# Patient Record
Sex: FEMALE | Race: WHITE | Employment: FULL TIME | URBAN - METROPOLITAN AREA
[De-identification: names, ages, dates, MRNs, and addresses within clinical notes are randomized per-mention and may not be internally consistent; named-entity substitution may affect disease eponyms.]

---

## 2018-10-01 PROBLEM — Z86.32 HISTORY OF GESTATIONAL DIABETES: Status: ACTIVE | Noted: 2018-10-01

## 2018-10-01 PROBLEM — R00.2 HEART PALPITATIONS: Status: ACTIVE | Noted: 2018-10-01

## 2018-10-01 PROBLEM — E78.00 HYPERCHOLESTEROLEMIA: Status: ACTIVE | Noted: 2018-10-01

## 2020-12-19 ENCOUNTER — APPOINTMENT (OUTPATIENT)
Dept: CT IMAGING | Age: 35
End: 2020-12-19
Attending: EMERGENCY MEDICINE
Payer: COMMERCIAL

## 2020-12-19 ENCOUNTER — HOSPITAL ENCOUNTER (EMERGENCY)
Age: 35
Discharge: HOME | End: 2020-12-19
Attending: EMERGENCY MEDICINE
Payer: COMMERCIAL

## 2020-12-19 VITALS
HEIGHT: 68 IN | BODY MASS INDEX: 25.46 KG/M2 | OXYGEN SATURATION: 98 % | DIASTOLIC BLOOD PRESSURE: 69 MMHG | TEMPERATURE: 98.8 F | SYSTOLIC BLOOD PRESSURE: 133 MMHG | RESPIRATION RATE: 18 BRPM | HEART RATE: 96 BPM | WEIGHT: 168 LBS

## 2020-12-19 DIAGNOSIS — D18.00 CAVERNOUS HEMANGIOMAS: ICD-10-CM

## 2020-12-19 DIAGNOSIS — K52.9 GASTROENTERITIS, ACUTE: Primary | ICD-10-CM

## 2020-12-19 DIAGNOSIS — Z20.822 PERSON UNDER INVESTIGATION FOR COVID-19: ICD-10-CM

## 2020-12-19 LAB
ALBUMIN SERPL-MCNC: 4.3 G/DL (ref 3.5–5)
ALBUMIN/GLOB SERPL: 1 {RATIO} (ref 1.2–3.5)
ALP SERPL-CCNC: 81 U/L (ref 50–130)
ALT SERPL-CCNC: 23 U/L (ref 12–65)
ANION GAP SERPL CALC-SCNC: 8 MMOL/L (ref 7–16)
AST SERPL-CCNC: 13 U/L (ref 15–37)
BASOPHILS # BLD: 0 K/UL (ref 0–0.2)
BASOPHILS NFR BLD: 0 % (ref 0–2)
BILIRUB SERPL-MCNC: 0.6 MG/DL (ref 0.2–1.1)
BUN SERPL-MCNC: 13 MG/DL (ref 6–23)
CALCIUM SERPL-MCNC: 9.5 MG/DL (ref 8.3–10.4)
CHLORIDE SERPL-SCNC: 106 MMOL/L (ref 98–107)
CO2 SERPL-SCNC: 27 MMOL/L (ref 21–32)
CREAT SERPL-MCNC: 0.79 MG/DL (ref 0.6–1)
DIFFERENTIAL METHOD BLD: ABNORMAL
EOSINOPHIL # BLD: 0 K/UL (ref 0–0.8)
EOSINOPHIL NFR BLD: 0 % (ref 0.5–7.8)
ERYTHROCYTE [DISTWIDTH] IN BLOOD BY AUTOMATED COUNT: 12.6 % (ref 11.9–14.6)
GLOBULIN SER CALC-MCNC: 4.1 G/DL (ref 2.3–3.5)
GLUCOSE SERPL-MCNC: 85 MG/DL (ref 65–100)
HCG UR QL: NEGATIVE
HCT VFR BLD AUTO: 43.8 % (ref 35.8–46.3)
HGB BLD-MCNC: 14.6 G/DL (ref 11.7–15.4)
IMM GRANULOCYTES # BLD AUTO: 0 K/UL (ref 0–0.5)
IMM GRANULOCYTES NFR BLD AUTO: 0 % (ref 0–5)
LIPASE SERPL-CCNC: 140 U/L (ref 73–393)
LYMPHOCYTES # BLD: 1.7 K/UL (ref 0.5–4.6)
LYMPHOCYTES NFR BLD: 21 % (ref 13–44)
MCH RBC QN AUTO: 29.8 PG (ref 26.1–32.9)
MCHC RBC AUTO-ENTMCNC: 33.3 G/DL (ref 31.4–35)
MCV RBC AUTO: 89.4 FL (ref 79.6–97.8)
MONOCYTES # BLD: 0.4 K/UL (ref 0.1–1.3)
MONOCYTES NFR BLD: 5 % (ref 4–12)
NEUTS SEG # BLD: 6.1 K/UL (ref 1.7–8.2)
NEUTS SEG NFR BLD: 74 % (ref 43–78)
NRBC # BLD: 0 K/UL (ref 0–0.2)
PLATELET # BLD AUTO: 224 K/UL (ref 150–450)
PMV BLD AUTO: 10.1 FL (ref 9.4–12.3)
POTASSIUM SERPL-SCNC: 3.5 MMOL/L (ref 3.5–5.1)
PROT SERPL-MCNC: 8.4 G/DL (ref 6.3–8.2)
RBC # BLD AUTO: 4.9 M/UL (ref 4.05–5.2)
SODIUM SERPL-SCNC: 141 MMOL/L (ref 136–145)
WBC # BLD AUTO: 8.2 K/UL (ref 4.3–11.1)

## 2020-12-19 PROCEDURE — 80053 COMPREHEN METABOLIC PANEL: CPT

## 2020-12-19 PROCEDURE — 74011000258 HC RX REV CODE- 258: Performed by: EMERGENCY MEDICINE

## 2020-12-19 PROCEDURE — 83690 ASSAY OF LIPASE: CPT

## 2020-12-19 PROCEDURE — 96374 THER/PROPH/DIAG INJ IV PUSH: CPT

## 2020-12-19 PROCEDURE — 81025 URINE PREGNANCY TEST: CPT

## 2020-12-19 PROCEDURE — 74177 CT ABD & PELVIS W/CONTRAST: CPT

## 2020-12-19 PROCEDURE — 85025 COMPLETE CBC W/AUTO DIFF WBC: CPT

## 2020-12-19 PROCEDURE — 99284 EMERGENCY DEPT VISIT MOD MDM: CPT

## 2020-12-19 PROCEDURE — 81003 URINALYSIS AUTO W/O SCOPE: CPT

## 2020-12-19 PROCEDURE — 74011250636 HC RX REV CODE- 250/636: Performed by: EMERGENCY MEDICINE

## 2020-12-19 PROCEDURE — 74011000636 HC RX REV CODE- 636: Performed by: EMERGENCY MEDICINE

## 2020-12-19 RX ORDER — DICLOFENAC POTASSIUM 50 MG/1
50 TABLET, FILM COATED ORAL 3 TIMES DAILY
Qty: 15 TAB | Refills: 0 | Status: SHIPPED | OUTPATIENT
Start: 2020-12-19 | End: 2021-10-26 | Stop reason: ALTCHOICE

## 2020-12-19 RX ORDER — ONDANSETRON 2 MG/ML
4 INJECTION INTRAMUSCULAR; INTRAVENOUS
Status: COMPLETED | OUTPATIENT
Start: 2020-12-19 | End: 2020-12-19

## 2020-12-19 RX ORDER — ONDANSETRON 4 MG/1
4 TABLET, ORALLY DISINTEGRATING ORAL
Qty: 20 TAB | Refills: 0 | Status: SHIPPED | OUTPATIENT
Start: 2020-12-19

## 2020-12-19 RX ORDER — SODIUM CHLORIDE 0.9 % (FLUSH) 0.9 %
10 SYRINGE (ML) INJECTION
Status: COMPLETED | OUTPATIENT
Start: 2020-12-19 | End: 2020-12-19

## 2020-12-19 RX ADMIN — SODIUM CHLORIDE 1000 ML: 900 INJECTION, SOLUTION INTRAVENOUS at 13:56

## 2020-12-19 RX ADMIN — IOPAMIDOL 100 ML: 755 INJECTION, SOLUTION INTRAVENOUS at 14:36

## 2020-12-19 RX ADMIN — SODIUM CHLORIDE 100 ML: 900 INJECTION, SOLUTION INTRAVENOUS at 14:36

## 2020-12-19 RX ADMIN — ONDANSETRON 4 MG: 2 INJECTION INTRAMUSCULAR; INTRAVENOUS at 13:56

## 2020-12-19 RX ADMIN — Medication 10 ML: at 14:36

## 2020-12-19 NOTE — ED TRIAGE NOTES
Pt states RUQ pain for about one week with nausea and diarrhea. Denies vomiting. States she does have appendix and gallbladder. Pt has a mask in triage.

## 2020-12-19 NOTE — ED NOTES
I have reviewed discharge instructions with the patient. The patient verbalized understanding. Patient left ED via Discharge Method: ambulatory to Home with spouse at bedside. Opportunity for questions and clarification provided. Patient given 2 scripts. To continue your aftercare when you leave the hospital, you may receive an automated call from our care team to check in on how you are doing. This is a free service and part of our promise to provide the best care and service to meet your aftercare needs.  If you have questions, or wish to unsubscribe from this service please call [phone]. Thank you for Choosing our [facility].

## 2020-12-19 NOTE — ACP (ADVANCE CARE PLANNING)
Met with patient to talk about [address] and wishes for CPR and ventilation. Patient/family have refused to have this discussion with . Advised patient/family to let me know if they change their mind.     KARLOS Coreas    [facility]    * [email or web address]

## 2020-12-19 NOTE — DISCHARGE INSTRUCTIONS
Patient Education        Gastroenteritis: Care Instructions  Your Care Instructions     Gastroenteritis is an illness that may cause nausea, vomiting, and diarrhea. It is sometimes called \"stomach flu. \" It can be caused by bacteria or a virus. You will probably begin to feel better in 1 to 2 days. In the meantime, get plenty of rest and make sure you do not become dehydrated. Dehydration occurs when your body loses too much fluid. Follow-up care is a key part of your treatment and safety. Be sure to make and go to all appointments, and call your doctor if you are having problems. It's also a good idea to know your test results and keep a list of the medicines you take. How can you care for yourself at home? · If your doctor prescribed antibiotics, take them as directed. Do not stop taking them just because you feel better. You need to take the full course of antibiotics. · Drink plenty of fluids to prevent dehydration, enough so that your urine is light yellow or clear like water. Choose water and other caffeine-free clear liquids until you feel better. If you have kidney, heart, or liver disease and have to limit fluids, talk with your doctor before you increase your fluid intake. · Drink fluids slowly, in frequent, small amounts, because drinking too much too fast can cause vomiting. · Begin eating mild foods, such as dry toast, yogurt, applesauce, bananas, and rice. Avoid spicy, hot, or high-fat foods, and do not drink alcohol or caffeine for a day or two. Do not drink milk or eat ice cream until you are feeling better. How to prevent gastroenteritis  · Keep hot foods hot and cold foods cold. · Do not eat meats, dressings, salads, or other foods that have been kept at room temperature for more than 2 hours. · Use a thermometer to check your refrigerator. It should be between 34°F and 40°F.  · Defrost meats in the refrigerator or microwave, not on the kitchen counter.   · Keep your hands and your kitchen clean. Wash your hands, cutting boards, and countertops with hot soapy water frequently. · Cook meat until it is well done. · Do not eat raw eggs or uncooked sauces made with raw eggs. · Do not take chances. If food looks or tastes spoiled, throw it out. When should you call for help? Call 911 anytime you think you may need emergency care. For example, call if:    · You vomit blood or what looks like coffee grounds.     · You passed out (lost consciousness).     · You pass maroon or very bloody stools. Call your doctor now or seek immediate medical care if:    · You have severe belly pain.     · You have signs of needing more fluids. You have sunken eyes, a dry mouth, and pass only a little dark urine.     · You feel like you are going to faint.     · You have increased belly pain that does not go away in 1 to 2 days.     · You have new or increased nausea, or you are vomiting.     · You have a new or higher fever.     · Your stools are black and tarlike or have streaks of blood. Watch closely for changes in your health, and be sure to contact your doctor if:    · You are dizzy or lightheaded.     · You urinate less than usual, or your urine is dark yellow or brown.     · You do not feel better with each day that goes by. Where can you learn more? Go to http://www.[employer].com/  Enter N142 in the search box to learn more about \"Gastroenteritis: Care Instructions. \"  Current as of: February 11, 2020               Content Version: 12.6  © 5586-4593 [employer]. Care instructions adapted under license by [employer] (which disclaims liability or warranty for this information). If you have questions about a medical condition or this instruction, always ask your healthcare professional. [address] any warranty or liability for your use of this information.

## 2020-12-19 NOTE — ED PROVIDER NOTES
Patient for the past week has had nausea no vomiting. Diarrhea. No dysuria or hematuria. No vaginal bleeding or discharge. Has had pain over the right abdomen. Points to the right upper quadrant but tender in the right lower quadrant. The history is provided by the patient. No  was used. Nausea   This is a new problem. Episode onset: 1 week ago. The problem occurs 2 to 4 times per day. The problem has not changed since onset. There has been no fever. Associated symptoms include abdominal pain and diarrhea. Pertinent negatives include no chills, no fever, no headaches, no myalgias, no URI and no headaches. Past Medical History:   Diagnosis Date    Carrier of group B Streptococcus     Diabetes mellitus during pregnancy     Multiple thyroid nodules        Past Surgical History:   Procedure Laterality Date    HX  SECTION      HX DILATION AND CURETTAGE      of uterus    HX TONSILLECTOMY           Family History:   Problem Relation Age of Onset    Thyroid Disease Paternal Grandfather         Thyroid Nodules    Thyroid Disease Paternal Aunt         Hypothroidism    Thyroid Disease Other         Hyperthyrodism/Cousin    Elevated Lipids Father     Thyroid Cancer Neg Hx        Social History     Socioeconomic History    Marital status:      Spouse name: Not on file    Number of children: Not on file    Years of education: Not on file    Highest education level: Not on file   Occupational History    Not on file   Social Needs    Financial resource strain: Not on file    Food insecurity     Worry: Not on file     Inability: Not on file    Transportation needs     Medical: Not on file     Non-medical: Not on file   Tobacco Use    Smoking status: Never Smoker    Smokeless tobacco: Never Used   Substance and Sexual Activity    Alcohol use: Yes     Alcohol/week: 0.0 standard drinks     Comment: occ.     Drug use: Not on file    Sexual activity: Not on file   Lifestyle    Physical activity     Days per week: Not on file     Minutes per session: Not on file    Stress: Not on file   Relationships    Social connections     Talks on phone: Not on file     Gets together: Not on file     Attends Islam service: Not on file     Active member of club or organization: Not on file     Attends meetings of clubs or organizations: Not on file     Relationship status: Not on file    Intimate partner violence     Fear of current or ex partner: Not on file     Emotionally abused: Not on file     Physically abused: Not on file     Forced sexual activity: Not on file   Other Topics Concern    Not on file   Social History Narrative    Not on file         ALLERGIES: Codeine    Review of Systems   Constitutional: Negative for chills and fever. HENT: Negative for rhinorrhea and sore throat. Eyes: Negative for pain and redness. Respiratory: Negative for chest tightness, shortness of breath and wheezing. Cardiovascular: Negative for chest pain and leg swelling. Gastrointestinal: Positive for abdominal pain, diarrhea and nausea. Negative for vomiting. Genitourinary: Negative for dysuria, hematuria, vaginal bleeding and vaginal discharge. Musculoskeletal: Negative for back pain, gait problem, myalgias, neck pain and neck stiffness. Skin: Negative for color change and rash. Neurological: Negative for weakness, numbness and headaches. Vitals:    12/19/20 1247   BP: 134/86   Pulse: 95   Resp: 16   Temp: 98.9 °F (37.2 °C)   SpO2: 98%   Weight: 76.2 kg (168 lb)   Height: 5' 8\" (1.727 m)            Physical Exam  Constitutional:       Appearance: Normal appearance. She is well-developed. HENT:      Head: Normocephalic and atraumatic. Neck:      Musculoskeletal: Normal range of motion and neck supple. Cardiovascular:      Rate and Rhythm: Normal rate and regular rhythm.    Pulmonary:      Effort: Pulmonary effort is normal.      Breath sounds: Normal breath sounds. Abdominal:      General: Bowel sounds are normal.      Palpations: Abdomen is soft. Tenderness: There is abdominal tenderness (RLQ). There is guarding. There is no right CVA tenderness. Musculoskeletal: Normal range of motion. General: No swelling. Skin:     General: Skin is warm and dry. Neurological:      General: No focal deficit present. Mental Status: She is alert and oriented to person, place, and time. MDM  Number of Diagnoses or Management Options  Diagnosis management comments: Appendix normal on CT. Discussed this with Dr. Alicia Baker. Patient has multiple cavernous hemangiomas of the liver. Discussed this with GI who will follow up with patient in the office. Amount and/or Complexity of Data Reviewed  Clinical lab tests: ordered and reviewed  Tests in the radiology section of CPT®: ordered and reviewed  Tests in the medicine section of CPT®: ordered and reviewed    Patient Progress  Patient progress: stable         Procedures        CT ABD PELV W CONT (Final result)  Result time 12/19/20 15:13:20  Final result by Marcus Bryant MD (12/19/20 15:13:20)                Impression:    IMPRESSION:  Cavernous hemangiomas right lobe of liver, 8mm - 60 mm. No acute   abnormality. Narrative:    CT ABDOMEN AND PELVIS WITH CONTRAST. HISTORY: Upper quadrant pain for a week. COMPARISON: None. TECHNIQUE: 5 mm axial scans from above the diaphragms to the pubic symphysis   following oral and 100 cc intravenous contrast without acute complication. Intravenous contrast was given to increase the sensitivity to acute   inflammation.  Radiation dose reduction techniques were used for this study. Our CT scanners use one or more of the following: Automated exposure control,   adjustment of the mA and or kV according to patient size, iterative   reconstruction. FINDINGS:   -Lung Bases:  The lung bases are clear.     -Liver: 18 mm peripherally enhancing focus right lobe of liver. Adjacent is a 16   mm peripherally enhancing focus. 8 mm nonenhancing focus x2 right lobe   -Gallbladder/Bile Ducts: No gallstones or bile duct dilation.   -Pancreas: Normal.   -Spleen: Uniform and normal size.     -Stomach: Unremarkable. -Bowel: Normal caliber.  No inflammatory changes.     -Kidneys/Ureters: Enhance symmetrically. No hydronephrosis. -Urinary Bladder: Unremarkable.   -Adrenals: Are normal size. -Reproductive Organs: Unremarkable.     -Lymph Nodes: No grossly enlarged retroperitoneal, mesenteric, or pelvic   adenopathy.   -Vasculature: Aorta is normal caliber.   -Bones: No gross bony lesions.     -Other: No ascites.                   Results Include:    Recent Results (from the past 24 hour(s))   CBC WITH AUTOMATED DIFF    Collection Time: 12/19/20  1:52 PM   Result Value Ref Range    WBC 8.2 4.3 - 11.1 K/uL    RBC 4.90 4.05 - 5.2 M/uL    HGB 14.6 11.7 - 15.4 g/dL    HCT 43.8 35.8 - 46.3 %    MCV 89.4 79.6 - 97.8 FL    MCH 29.8 26.1 - 32.9 PG    MCHC 33.3 31.4 - 35.0 g/dL    RDW 12.6 11.9 - 14.6 %    PLATELET 979 500 - 150 K/uL    MPV 10.1 9.4 - 12.3 FL    ABSOLUTE NRBC 0.00 0.0 - 0.2 K/uL    DF AUTOMATED      NEUTROPHILS 74 43 - 78 %    LYMPHOCYTES 21 13 - 44 %    MONOCYTES 5 4.0 - 12.0 %    EOSINOPHILS 0 (L) 0.5 - 7.8 %    BASOPHILS 0 0.0 - 2.0 %    IMMATURE GRANULOCYTES 0 0.0 - 5.0 %    ABS. NEUTROPHILS 6.1 1.7 - 8.2 K/UL    ABS. LYMPHOCYTES 1.7 0.5 - 4.6 K/UL    ABS. MONOCYTES 0.4 0.1 - 1.3 K/UL    ABS. EOSINOPHILS 0.0 0.0 - 0.8 K/UL    ABS. BASOPHILS 0.0 0.0 - 0.2 K/UL    ABS. IMM.  GRANS. 0.0 0.0 - 0.5 K/UL   METABOLIC PANEL, COMPREHENSIVE    Collection Time: 12/19/20  1:52 PM   Result Value Ref Range    Sodium 141 136 - 145 mmol/L    Potassium 3.5 3.5 - 5.1 mmol/L    Chloride 106 98 - 107 mmol/L    CO2 27 21 - 32 mmol/L    Anion gap 8 7 - 16 mmol/L    Glucose 85 65 - 100 mg/dL    BUN 13 6 - 23 MG/DL    Creatinine 0.79 0.6 - 1.0 MG/DL GFR est AA >60 >60 ml/min/1.73m2    GFR est non-AA >60 >60 ml/min/1.73m2    Calcium 9.5 8.3 - 10.4 MG/DL    Bilirubin, total 0.6 0.2 - 1.1 MG/DL    ALT (SGPT) 23 12 - 65 U/L    AST (SGOT) 13 (L) 15 - 37 U/L    Alk.  phosphatase 81 50 - 130 U/L    Protein, total 8.4 (H) 6.3 - 8.2 g/dL    Albumin 4.3 3.5 - 5.0 g/dL    Globulin 4.1 (H) 2.3 - 3.5 g/dL    A-G Ratio 1.0 (L) 1.2 - 3.5     LIPASE    Collection Time: 12/19/20  1:52 PM   Result Value Ref Range    Lipase 140 73 - 393 U/L   HCG URINE, QL. - POC    Collection Time: 12/19/20  3:05 PM   Result Value Ref Range    Pregnancy test,urine (POC) Negative NEG

## 2020-12-19 NOTE — PROGRESS NOTES
SW met with patient, confirmed demographic information. Patient reports being independent at home, does not use assistive devices to ambulate, and denies any falls. Patient is also established with primary care and is seen regularly. No needs identified from SW at this time.      KARLOS Sandoval     [facility]    * [email or web address]

## 2020-12-24 ENCOUNTER — HOSPITAL ENCOUNTER (OUTPATIENT)
Dept: LAB | Age: 35
Discharge: HOME | End: 2020-12-24

## 2020-12-24 PROCEDURE — 88305 TISSUE EXAM BY PATHOLOGIST: CPT

## 2020-12-24 PROCEDURE — 88312 SPECIAL STAINS GROUP 1: CPT

## 2021-04-18 PROBLEM — D18.03 LIVER HEMANGIOMA: Status: ACTIVE | Noted: 2021-04-18

## 2021-04-18 PROBLEM — D18.00 CAVERNOUS HEMANGIOMAS: Status: ACTIVE | Noted: 2021-04-18

## 2021-04-18 PROBLEM — K29.70 GASTRITIS: Status: ACTIVE | Noted: 2021-04-18

## 2022-03-18 PROBLEM — Z86.32 HISTORY OF GESTATIONAL DIABETES: Status: ACTIVE | Noted: 2018-10-01

## 2022-03-19 PROBLEM — K29.70 GASTRITIS: Status: ACTIVE | Noted: 2021-04-18

## 2022-03-19 PROBLEM — R00.2 HEART PALPITATIONS: Status: ACTIVE | Noted: 2018-10-01

## 2022-03-19 PROBLEM — E78.00 HYPERCHOLESTEROLEMIA: Status: ACTIVE | Noted: 2018-10-01

## 2022-03-20 PROBLEM — D18.00 CAVERNOUS HEMANGIOMAS: Status: ACTIVE | Noted: 2021-04-18

## 2022-10-26 ENCOUNTER — OFFICE VISIT (OUTPATIENT)
Dept: ENDOCRINOLOGY | Age: 37
End: 2022-10-26
Payer: COMMERCIAL

## 2022-10-26 VITALS
OXYGEN SATURATION: 98 % | DIASTOLIC BLOOD PRESSURE: 84 MMHG | SYSTOLIC BLOOD PRESSURE: 116 MMHG | HEART RATE: 81 BPM | WEIGHT: 170 LBS

## 2022-10-26 DIAGNOSIS — Z86.32 HISTORY OF GESTATIONAL DIABETES: ICD-10-CM

## 2022-10-26 DIAGNOSIS — E04.2 MULTIPLE THYROID NODULES: Primary | ICD-10-CM

## 2022-10-26 DIAGNOSIS — Z86.39 HISTORY OF THYROIDITIS: ICD-10-CM

## 2022-10-26 LAB
EST. AVERAGE GLUCOSE BLD GHB EST-MCNC: 105 MG/DL
HBA1C MFR BLD: 5.3 % (ref 4.8–5.6)

## 2022-10-26 PROCEDURE — 99214 OFFICE O/P EST MOD 30 MIN: CPT | Performed by: INTERNAL MEDICINE

## 2022-10-26 PROCEDURE — 76536 US EXAM OF HEAD AND NECK: CPT | Performed by: INTERNAL MEDICINE

## 2022-10-26 ASSESSMENT — ENCOUNTER SYMPTOMS
VOICE CHANGE: 0
TROUBLE SWALLOWING: 0

## 2022-10-26 NOTE — PROGRESS NOTES
Braxton Mejias MD, [address] [place] [address]            Reason for visit: Follow-up of thyroid nodules      ASSESSMENT AND PLAN:    1. Multiple thyroid nodules  Prior biopsy of the dominant nodule in the right lobe in 2013 was fortunately benign. Ultrasound was repeated today. The nodules may have increased slightly in size over time but continue to demonstrate otherwise benign sonographic features. Repeat biopsy is not indicated. I will repeat ultrasound in 1 year. She would also like to discuss possibility of thyroid surgery with Dr. Aiden Elliott at [facility]. I will arrange it.  - US,HEAD/NECK TISSUES,REAL TIME  - External Referral to General Surgery    2. History of thyroiditis  She appears to have postpartum thyroiditis last year. I will recheck thyroid function today.  - TSH; Future  - T4, Free; Future  - T3; Future    3. History of gestational diabetes  - Comprehensive Metabolic Panel; Future  - Hemoglobin A1C; Future        PROCEDURES:    HEAD AND NECK ULTRASOUND    Date of study:  10/26/2022    Performing/interpreting physician:  Braxton Mejias MD, FACE    Indication:  thyroid nodule    Technique:  Using a 12 MHz linear transducer, multiple real-time planar images were obtained of the thyroid and surrounding tissues. Findings:  Right lobe 1.63 x 2.12 x 5.20 cm, isthmus 0.23 cm, left lobe 1.43 x 1.99 x 4.63 cm. Homogeneous echotexture. Normal blood flow. 0.73 x 0.81 x 1.16 cm isoechoic nodule without calcifications or increased blood flow at the right upper pole (TR 3). 1.48 x 2.30 x 2.81 cm complex isoechoic nodule without calcifications or increased blood flow at the right lower pole (TR 3). 1.25 x 1.53 x 3.00 cm complex isoechoic nodule without calcifications or increased blood flow in the midportion of the left lobe (TR 3). Impression: Multiple thyroid nodules as noted.     Recommendations:   TR1: Benign, no FNA or ultrasound follow-up   TR2: Nonsuspicious, no FNA or ultrasound follow-up   TR3: Mildly suspicious, FNA if greater than or equal to 2.5 cm, follow with ultrasound at 1, 3,   and 5 years if greater than or equal to 1.5 cm.   TR4: Moderately suspicious, FNA if greater than or equal to 1.5 cm, follow with ultrasound at 1,   2, 3, and 5 years if greater than or equal to 1 cm. TR5: Highly suspicious, FNA if greater than or equal to 1 cm, follow with ultrasound annually   for 5 years if greater than or equal to 0.5 cm. Follow-up and Dispositions    Return in about 1 year (around 10/26/2023). History of Present Illness:    THYROID NODULES  [facility] is seen today in the [place] [address] for follow up of a thyroid nodule. There is not a history of radiation to the head/neck. The patient does not have a family history of thyroid cancer. Current symptoms: See review of systems below     Prior imagin2012: Ultrasound (S)- Right lobe 5.2 x 1.3 x 1.6 cm, left lobe 5.0 x 1.0 x 1.2 cm. 1.3 x 0.7 x 1.0 cm complex problem rarely cystic nodule at the right lower pole. 0.9 x 0.4 x 0.5 cm complex cyst at the left upper pole. 2013: Ultrasound [facility])- Right lobe 1.84 x 2.02 x 3.69 cm, left lobe 0.90 x 0.54 x 1.43 cm. 1.42 x 1.22 x 2.56 cm complex (primarily cystic) nodule in the mid to lower portion of the right lobe. Subcentimeter hypoechoic nodule in the left lobe. 2016: Ultrasound ([facility])- Right lobe 1.82 x 1.81 x 4.06 cm, isthmus 0.29 cm, left lobe 1.34 x 1.47 x 4.73 cm. Homogeneous echotexture. 1.04 x 1.73 x 1.80 cm complex spongiform nodule without calcifications or increased blood flow at the right lower pole. 0.74 x 0.91 x 1.68 cm hypoechoic colloid nodule (with comet tails) at the left upper pole. 10/21/2016: Ultrasound ([facility])- Right lobe dimensions not recorded (not enlarged), isthmus 0.28 cm, left lobe 0.84 x 1.55 x 3.89 cm. Homogeneous echotexture.   1.10 x 1.66 x 1.97 cm complex spongiform nodule without calcifications or increased blood flow at the right lower pole. 0.81 x 0.90 x 1.67 cm hypoechoic colloid nodule (with comet tails) at the left upper pole. 1/31/2018: Ultrasound ([facility])- Right lobe 1.49 x 2.01 x 4.97 cm, isthmus 0.21 cm, left lobe 1.09 x 1.66 x 5.76 cm. Homogeneous echotexture. 1.25 x 2.17 x 2.02 cm complex spongiform nodule without calcifications or increased blood flow at the right lower pole. 0.88 x 1.10 x 1.85 cm complex hypoechoic colloid nodule (with comet tails) at the left upper pole. 10/15/2018: Ultrasound ([facility])- Right lobe 1.79 x 1.91 x 5.51 cm, isthmus 0.28 cm, left lobe 1.22 x 1.44 x 3.77 cm. Homogeneous echotexture. Normal blood flow. 1.17 x 2.25 x 2.24 cm complex isoechoic spongiform nodule without calcifications or increased blood flow at the right lower pole. 0.88 x 1.12 x 1.99 cm complex hypoechoic nodule (with comet tails) and no calcifications or increased blood flow at the left upper pole. 10/16/2019: Ultrasound ([facility])- Right lobe 2.03 x 2.29 x 4.96 cm, left lobe 0.99 x 1.59 x 5.55 cm. Homogeneous echotexture. Normal blood flow. 1.56 x 2.56 x 2.56 cm complex (30 to 40% cystic) isoechoic nodule without calcifications or increased blood flow at the right lower pole. 0.93 x 1.13 x 2.09 cm slightly hypoechoic complex (approximately 20% cystic) nodule with comet tails and without calcifications or increased blood flow at the left upper pole. 10/16/2020: Ultrasound (Garcia)- Right lobe 1.82 x 2.13 x 5.64 cm, isthmus 0.32 cm, left lobe 1.58 x 1.60 x 4.68 cm. Homogeneous echotexture. Normal blood flow. 0.61 x 0.80 x 1.15 cm isoechoic spongiform nodule without calcifications or increased blood flow at the right upper pole. 1.39 x 2.69 x 2.77 cm complex (40 to 50% cystic) isoechoic nodule without calcifications or increased blood flow at the right lower pole.   1.13 x 1.25 x 2.21 cm complex hypoechoic nodule without calcifications or increased blood flow at the left lower pole. 10/26/2021: Ultrasound (G[facility])- Right lobe 1.88 x 2.33 x 5.05 cm, isthmus 0.20 cm, left lobe 1.41 x 2.17 x 4.67 cm. Homogeneous echotexture. Normal blood flow. 0.74 x 0.89 x 1.35 cm isoechoic spongiform nodule without calcifications or increased blood flow at the right upper pole (TR 2). 1.37 x 2.49 x 2.79 cm complex (30 to 40% cystic) isoechoic nodule without calcifications or increased blood flow at the right lower pole (TR 3). 1.13 x 1.54 x 2.53 cm isoechoic spongiform nodule without calcifications or increased blood flow at the left upper pole (TR 2). 10/26/2022: Ultrasound ([facility])- Right lobe 1.63 x 2.12 x 5.20 cm, isthmus 0.23 cm, left lobe 1.43 x 1.99 x 4.63 cm. Homogeneous echotexture. Normal blood flow. 0.73 x 0.81 x 1.16 cm isoechoic nodule without calcifications or increased blood flow at the right upper pole (TR 3). 1.48 x 2.30 x 2.81 cm complex isoechoic nodule without calcifications or increased blood flow at the right lower pole (TR 3). 1.25 x 1.53 x 3.00 cm complex isoechoic nodule without calcifications or increased blood flow in the midportion of the left lobe (TR 3). Prior laboratory evaluation:  8/8/2013: TSH 1.446.  6/13/2018: TSH 0.441, free T4 1.15.  10/1/2018: TSH 0.722.  4/25/2019: TSH 0.700, free T4 1.24.  10/16/2019: TSH 0.663, free T4 1.83.  10/16/2020: TSH 1.060, free T4 1. 19.  10/26/2021: TSH 0.027, free T4 1.14, hemoglobin A1c 5.4%. 12/22/2021: TSH 0.202, free T4 1.13, T3 148, thyroid-stimulating immunoglobulins less than 0.10.  4/5/2022: TSH 0.501, free T4 1.13, T3 150. Prior biopsies:  8/8/2013: Fine needle aspiration biopsy of right lobe nodule G[facility] Candida)- benign. Review of Systems   Constitutional:  Negative for fatigue and unexpected weight change. HENT:  Negative for trouble swallowing and voice change.       /84 (Site: Left Upper Arm, Position: Sitting)   Pulse 81   Wt 170 lb (77.1 kg)   SpO2 98%   Wt Readings from Last 3 Encounters:   10/26/22 170 lb (77.1 kg)   10/26/21 173 lb (78.5 kg)       Physical Exam  HENT:      Head: Normocephalic. Neck:      Thyroid: Thyromegaly present. No thyroid mass. Cardiovascular:      Rate and Rhythm: Normal rate. Pulmonary:      Effort: No respiratory distress. Breath sounds: Normal breath sounds. Neurological:      Mental Status: She is alert. Psychiatric:         Mood and Affect: Mood normal.         Behavior: Behavior normal.       Orders Placed This Encounter   Procedures    US,HEAD/NECK TISSUES,REAL TIME    Comprehensive Metabolic Panel     Standing Status:   Future     Number of Occurrences:   1     Standing Expiration Date:   10/26/2023    Hemoglobin A1C     Standing Status:   Future     Number of Occurrences:   1     Standing Expiration Date:   10/26/2023    TSH     Standing Status:   Future     Number of Occurrences:   1     Standing Expiration Date:   10/26/2023    T4, Free     Standing Status:   Future     Number of Occurrences:   1     Standing Expiration Date:   10/26/2023    T3     Standing Status:   Future     Number of Occurrences:   1     Standing Expiration Date:   10/26/2023    External Referral to General Surgery     Referral Priority:   Routine     Referral Type:   Eval and Treat     Referral Reason:   Specialty Services Required     Referred to Provider:   Brittni Chaves MD     Requested Specialty:   General Surgery     Number of Visits Requested:   1       Current Outpatient Medications   Medication Sig Dispense Refill    Multiple Vitamin (MULTIVITAMIN PO) Take by mouth       No current facility-administered medications for this visit. Elis Vera MD, FACE      Portions of this note were generated with the assistance of voice recognition software. As such, some errors in transcription may be present.

## 2022-10-27 LAB
ALBUMIN SERPL-MCNC: 4.3 G/DL (ref 3.5–5)
ALBUMIN/GLOB SERPL: 1.3 {RATIO} (ref 0.4–1.6)
ALP SERPL-CCNC: 87 U/L (ref 50–136)
ALT SERPL-CCNC: 23 U/L (ref 12–65)
ANION GAP SERPL CALC-SCNC: 4 MMOL/L (ref 2–11)
AST SERPL-CCNC: 11 U/L (ref 15–37)
BILIRUB SERPL-MCNC: 0.4 MG/DL (ref 0.2–1.1)
BUN SERPL-MCNC: 13 MG/DL (ref 6–23)
CALCIUM SERPL-MCNC: 9.9 MG/DL (ref 8.3–10.4)
CHLORIDE SERPL-SCNC: 109 MMOL/L (ref 101–110)
CO2 SERPL-SCNC: 27 MMOL/L (ref 21–32)
CREAT SERPL-MCNC: 0.7 MG/DL (ref 0.6–1)
GLOBULIN SER CALC-MCNC: 3.3 G/DL (ref 2.8–4.5)
GLUCOSE SERPL-MCNC: 89 MG/DL (ref 65–100)
POTASSIUM SERPL-SCNC: 4.1 MMOL/L (ref 3.5–5.1)
PROT SERPL-MCNC: 7.6 G/DL (ref 6.3–8.2)
SODIUM SERPL-SCNC: 140 MMOL/L (ref 133–143)
T3 SERPL-MCNC: 1.38 NG/ML (ref 0.6–1.81)
T4 FREE SERPL-MCNC: 0.9 NG/DL (ref 0.78–1.46)
TSH, 3RD GENERATION: 0.61 UIU/ML (ref 0.36–3.74)

## 2023-03-31 DIAGNOSIS — E04.2 MULTIPLE THYROID NODULES: ICD-10-CM

## 2023-03-31 LAB
ALBUMIN SERPL-MCNC: 4.3 G/DL (ref 3.5–5)
ALBUMIN/GLOB SERPL: 1.3 (ref 0.4–1.6)
ALP SERPL-CCNC: 81 U/L (ref 50–136)
ALT SERPL-CCNC: 25 U/L (ref 12–65)
ANION GAP SERPL CALC-SCNC: 4 MMOL/L (ref 2–11)
AST SERPL-CCNC: 15 U/L (ref 15–37)
BILIRUB SERPL-MCNC: 0.4 MG/DL (ref 0.2–1.1)
BUN SERPL-MCNC: 17 MG/DL (ref 6–23)
CALCIUM SERPL-MCNC: 9.5 MG/DL (ref 8.3–10.4)
CHLORIDE SERPL-SCNC: 106 MMOL/L (ref 101–110)
CO2 SERPL-SCNC: 28 MMOL/L (ref 21–32)
CREAT SERPL-MCNC: 0.8 MG/DL (ref 0.6–1)
GLOBULIN SER CALC-MCNC: 3.2 G/DL (ref 2.8–4.5)
GLUCOSE SERPL-MCNC: 84 MG/DL (ref 65–100)
POTASSIUM SERPL-SCNC: 4.1 MMOL/L (ref 3.5–5.1)
PROT SERPL-MCNC: 7.5 G/DL (ref 6.3–8.2)
SODIUM SERPL-SCNC: 138 MMOL/L (ref 133–143)
T4 FREE SERPL-MCNC: 0.8 NG/DL (ref 0.78–1.46)
TSH, 3RD GENERATION: 3.45 UIU/ML (ref 0.36–3.74)

## 2023-04-04 ENCOUNTER — OFFICE VISIT (OUTPATIENT)
Dept: ENDOCRINOLOGY | Age: 38
End: 2023-04-04
Payer: COMMERCIAL

## 2023-04-04 VITALS
OXYGEN SATURATION: 98 % | WEIGHT: 170 LBS | SYSTOLIC BLOOD PRESSURE: 120 MMHG | DIASTOLIC BLOOD PRESSURE: 84 MMHG | HEART RATE: 83 BPM

## 2023-04-04 DIAGNOSIS — E04.2 MULTIPLE THYROID NODULES: ICD-10-CM

## 2023-04-04 DIAGNOSIS — E89.0 POSTSURGICAL HYPOTHYROIDISM: Primary | ICD-10-CM

## 2023-04-04 PROCEDURE — 99214 OFFICE O/P EST MOD 30 MIN: CPT | Performed by: INTERNAL MEDICINE

## 2023-04-04 RX ORDER — LEVOTHYROXINE SODIUM 25 MCG
25 TABLET ORAL DAILY
Qty: 30 TABLET | Refills: 11 | Status: SHIPPED | OUTPATIENT
Start: 2023-04-04

## 2023-04-04 ASSESSMENT — ENCOUNTER SYMPTOMS
TROUBLE SWALLOWING: 0
VOICE CHANGE: 0

## 2023-04-04 NOTE — PROGRESS NOTES
Camacho Smith MD, [address] [facility]            Reason for visit: Follow-up of thyroid nodules      ASSESSMENT AND PLAN:    1. Postsurgical hypothyroidism  Her TSH is higher than before surgery, and she does have some new symptoms (primarily fatigue). I will start low-dose thyroid hormone replacement (she requests branded Synthroid). She will recheck thyroid function tests in 6 weeks and then again prior to the next appointment with me.  - SYNTHROID 25 MCG tablet; Take 1 tablet by mouth Daily  Dispense: 30 tablet; Refill: 11  - TSH; Future  - T4, Free; Future  - TSH; Future  - T4, Free; Future    2. Multiple thyroid nodules  Periodic ultrasound of the residual lobe is warranted. Follow-up and Dispositions    Return in about 6 months (around 10/4/2023). History of Present Illness:    THYROID NODULES  Blaise Raines is seen today in the [place] [id number] for follow up of a thyroid nodule. There is not a history of radiation to the head/neck. The patient does not have a family history of thyroid cancer. Because of the presence of a large right thyroid lobe nodule with potentially attributable symptoms, she underwent right lobectomy and isthmusectomy with Dr. Froy Montgomery 2023. Surgical pathology was fortunately benign. Current symptoms: See review of systems below     Prior imagin2012: Ultrasound (S)- Right lobe 5.2 x 1.3 x 1.6 cm, left lobe 5.0 x 1.0 x 1.2 cm. 1.3 x 0.7 x 1.0 cm complex problem rarely cystic nodule at the right lower pole. 0.9 x 0.4 x 0.5 cm complex cyst at the left upper pole. 2013: Ultrasound Jojo Jennifer)- Right lobe 1.84 x 2.02 x 3.69 cm, left lobe 0.90 x 0.54 x 1.43 cm. 1.42 x 1.22 x 2.56 cm complex (primarily cystic) nodule in the mid to lower portion of the right lobe. Subcentimeter hypoechoic nodule in the left lobe.     2016: Ultrasound (Garcia)- Right lobe 1.82 x 1.81 x 4.06 cm, isthmus
no

## 2023-04-05 ENCOUNTER — PATIENT MESSAGE (OUTPATIENT)
Dept: ENDOCRINOLOGY | Age: 38
End: 2023-04-05

## 2023-04-05 NOTE — TELEPHONE ENCOUNTER
From: Riddhi Strong  To: Dr. Giancarlo Gonzalez  Sent: 4/5/2023 6:53 AM EDT  Subject: Synthroid Medication Change     Hi! During my appt yesterday afternoon we spoke about beginning Synthroid due to having hypo symptoms. After some research I was wondering if it would possible to start with Horton, as opposed to Synthroid? I realize this specific med may not be covered under insurance and that is ok, but I would like to try it first if possible. Thank you!    Librado Centeno

## 2023-04-27 DIAGNOSIS — E89.0 POSTSURGICAL HYPOTHYROIDISM: ICD-10-CM

## 2023-04-28 ENCOUNTER — PATIENT MESSAGE (OUTPATIENT)
Dept: ENDOCRINOLOGY | Age: 38
End: 2023-04-28

## 2023-04-28 DIAGNOSIS — E89.0 POSTSURGICAL HYPOTHYROIDISM: Primary | ICD-10-CM

## 2023-04-28 DIAGNOSIS — E89.0 POSTSURGICAL HYPOTHYROIDISM: ICD-10-CM

## 2023-04-28 LAB
ALBUMIN SERPL-MCNC: 4.1 G/DL (ref 3.5–5)
ALBUMIN/GLOB SERPL: 1.3 (ref 0.4–1.6)
ALP SERPL-CCNC: 86 U/L (ref 50–136)
ALT SERPL-CCNC: 25 U/L (ref 12–65)
ANION GAP SERPL CALC-SCNC: 1 MMOL/L (ref 2–11)
AST SERPL-CCNC: 13 U/L (ref 15–37)
BILIRUB SERPL-MCNC: 0.3 MG/DL (ref 0.2–1.1)
BUN SERPL-MCNC: 16 MG/DL (ref 6–23)
CALCIUM SERPL-MCNC: 9.9 MG/DL (ref 8.3–10.4)
CHLORIDE SERPL-SCNC: 106 MMOL/L (ref 101–110)
CO2 SERPL-SCNC: 31 MMOL/L (ref 21–32)
CREAT SERPL-MCNC: 0.7 MG/DL (ref 0.6–1)
FERRITIN SERPL-MCNC: 22 NG/ML (ref 8–388)
GLOBULIN SER CALC-MCNC: 3.1 G/DL (ref 2.8–4.5)
GLUCOSE SERPL-MCNC: 85 MG/DL (ref 65–100)
POTASSIUM SERPL-SCNC: 3.8 MMOL/L (ref 3.5–5.1)
PROT SERPL-MCNC: 7.2 G/DL (ref 6.3–8.2)
SODIUM SERPL-SCNC: 138 MMOL/L (ref 133–143)
T3 SERPL-MCNC: 1.11 NG/ML (ref 0.6–1.81)
T4 FREE SERPL-MCNC: 0.8 NG/DL (ref 0.78–1.46)
T4 SERPL-MCNC: 6.9 UG/DL (ref 4.8–13.9)
TSH, 3RD GENERATION: 3.82 UIU/ML (ref 0.36–3.74)

## 2023-04-28 RX ORDER — LEVOTHYROXINE SODIUM 13 UG/1
1 CAPSULE ORAL DAILY
Qty: 30 CAPSULE | Refills: 5 | Status: SHIPPED | OUTPATIENT
Start: 2023-04-28

## 2023-04-28 NOTE — TELEPHONE ENCOUNTER
My Chart message patient:    Yes, one advantage to Tirosint is that it does come in a smaller capsule size without the need to split a dose. We will start with 13 mcg. I submitted the prescription. Please recheck TSH and free T4 in 4 to 6 weeks. I submitted the order.

## 2023-04-28 NOTE — TELEPHONE ENCOUNTER
From: Dr. Raf Chacon  To: Balaji Adria Strong  Sent: 4/28/2023 1:33 PM EDT  Subject: Lab review    Yes, I think that trying a medication at this point would be reasonable. As you may know, Tirosint is a gel-coated levothyroxine. I have never been able to get that approved without someone first failing levothyroxine tablets (i.e labs not improving on tablets). The tablets are just as good as the gelcaps unless the patient has a malabsorptive syndrome (history of bowel resection, etc.). If you want me to send in the [employer] to see if your insurance company will approve it, I am happy to do so. Likewise, if you are okay with starting levothyroxine tablets (which is what I would recommend), I can do that as well.

## 2023-04-28 NOTE — TELEPHONE ENCOUNTER
From: Trinidad Strong  To: Dr. Leonarda Ye  Sent: 4/28/2023 10:06 AM EDT  Subject: Question regarding Ferritin    Thanks! I noticed the others have not come through yet and wondered if she caught those orders, but I assume they were the same order as the T3 and CMP so the resulting is just running late. Also, I noticed the Anion Gap was below normal and the past 3 times, over the past 6 months, its been above normal. I dont think this is related to my thyroid at all, but Im not seeing any connected results (electrolytes) that should cause alarm and from what I can tell it says most times a low result is due to lab error. Thoughts?

## 2023-04-29 LAB — T3FREE SERPL-MCNC: 2.9 PG/ML (ref 2–4.4)

## 2023-05-23 ENCOUNTER — PATIENT MESSAGE (OUTPATIENT)
Dept: ENDOCRINOLOGY | Age: 38
End: 2023-05-23

## 2023-05-30 ENCOUNTER — TELEPHONE (OUTPATIENT)
Dept: FAMILY MEDICINE CLINIC | Facility: CLINIC | Age: 38
End: 2023-05-30

## 2023-05-30 ENCOUNTER — TELEPHONE (OUTPATIENT)
Dept: ENDOCRINOLOGY | Age: 38
End: 2023-05-30

## 2023-05-30 DIAGNOSIS — E89.0 POSTSURGICAL HYPOTHYROIDISM: ICD-10-CM

## 2023-05-30 LAB
T4 FREE SERPL-MCNC: 0.8 NG/DL (ref 0.78–1.46)
TSH, 3RD GENERATION: 2.49 UIU/ML (ref 0.36–3.74)

## 2023-05-30 NOTE — TELEPHONE ENCOUNTER
----- Message from Valencia Harden sent at 5/30/2023  9:40 AM EDT -----  Subject: Message to Provider    QUESTIONS  Information for Provider? Patient had to cancel her appointment for   [birth year]27[birth year] due to a conflict, but needs to reschedule. Since it lists Dr. Breanne Peace as her PCP, the [facility] ([facility] was unable to schedule her new patient   appointment. Please advise. Thanks.  ---------------------------------------------------------------------------  --------------  Kain Richard [employer]  [phone]; OK to leave message on voicemail,OK to respond with electronic   message via [employer] portal (only for patients who have registered [employer]   account)  ---------------------------------------------------------------------------  --------------  SCRIPT ANSWERS  Relationship to Patient?  Self

## 2023-05-30 NOTE — TELEPHONE ENCOUNTER
From: Nora Strong  To: Dr. Nils Morales: 5/23/2023 3:01 PM EDT  Subject: Adding Free T3     Hi Dr. Ariane Abreu! I am going mid week next week to have my labs drawn since starting on Tirosint. Right now we have TSH and Free T4 to be drawn but could we also add on Free T3 as well? Thank you!     Isiah Medellin

## 2023-06-29 DIAGNOSIS — E89.0 POSTSURGICAL HYPOTHYROIDISM: ICD-10-CM

## 2023-06-29 LAB
T4 FREE SERPL-MCNC: 0.9 NG/DL (ref 0.78–1.46)
TSH, 3RD GENERATION: 3.01 UIU/ML (ref 0.36–3.74)

## 2023-06-30 ENCOUNTER — PATIENT MESSAGE (OUTPATIENT)
Dept: ENDOCRINOLOGY | Age: 38
End: 2023-06-30

## 2023-06-30 DIAGNOSIS — E89.0 POSTSURGICAL HYPOTHYROIDISM: Primary | ICD-10-CM

## 2023-06-30 RX ORDER — LEVOTHYROXINE SODIUM 25 UG/1
1 CAPSULE ORAL DAILY
Qty: 30 CAPSULE | Refills: 5 | Status: SHIPPED | OUTPATIENT
Start: 2023-06-30

## 2023-07-18 ENCOUNTER — PATIENT MESSAGE (OUTPATIENT)
Dept: ENDOCRINOLOGY | Age: 38
End: 2023-07-18

## 2023-07-18 DIAGNOSIS — E89.0 POSTSURGICAL HYPOTHYROIDISM: Primary | ICD-10-CM

## 2023-08-10 ENCOUNTER — PATIENT MESSAGE (OUTPATIENT)
Dept: ENDOCRINOLOGY | Age: 38
End: 2023-08-10

## 2023-08-10 DIAGNOSIS — E89.0 POSTSURGICAL HYPOTHYROIDISM: ICD-10-CM

## 2023-08-10 LAB
T4 FREE SERPL-MCNC: 0.9 NG/DL (ref 0.78–1.46)
TSH, 3RD GENERATION: 2.47 UIU/ML (ref 0.36–3.74)

## 2023-08-11 RX ORDER — LEVOTHYROXINE SODIUM 50 UG/1
50 CAPSULE ORAL DAILY
Qty: 30 CAPSULE | Refills: 5 | Status: SHIPPED | OUTPATIENT
Start: 2023-08-11

## 2023-08-11 NOTE — TELEPHONE ENCOUNTER
From: Zainab Strong  To: Dr. Box Back  Sent: 8/10/2023 5:03 PM EDT  Subject: Question regarding TSH    Hello! My TSH is not trending as far down with the 25mcg as I had hoped. It looks like we may need to adjust to 50mcg of the Tirosint in order to attempt to achieve a TSH of 1. Thoughts?      Rhonda Schmitz

## 2023-10-04 DIAGNOSIS — E89.0 POSTSURGICAL HYPOTHYROIDISM: ICD-10-CM

## 2023-10-04 LAB
T4 FREE SERPL-MCNC: 1 NG/DL (ref 0.78–1.46)
TSH, 3RD GENERATION: 1.78 UIU/ML (ref 0.36–3.74)

## 2023-10-09 ENCOUNTER — OFFICE VISIT (OUTPATIENT)
Dept: ENDOCRINOLOGY | Age: 38
End: 2023-10-09
Payer: COMMERCIAL

## 2023-10-09 VITALS — SYSTOLIC BLOOD PRESSURE: 125 MMHG | WEIGHT: 185 LBS | DIASTOLIC BLOOD PRESSURE: 82 MMHG

## 2023-10-09 DIAGNOSIS — E04.2 MULTIPLE THYROID NODULES: ICD-10-CM

## 2023-10-09 DIAGNOSIS — E89.0 POSTSURGICAL HYPOTHYROIDISM: Primary | ICD-10-CM

## 2023-10-09 PROCEDURE — 99213 OFFICE O/P EST LOW 20 MIN: CPT | Performed by: INTERNAL MEDICINE

## 2023-10-09 RX ORDER — LEVOTHYROXINE SODIUM 62.5 UG/1
62.5 CAPSULE ORAL DAILY
Qty: 90 CAPSULE | Refills: 3 | Status: SHIPPED | OUTPATIENT
Start: 2023-10-09

## 2023-10-09 ASSESSMENT — ENCOUNTER SYMPTOMS
VOICE CHANGE: 0
TROUBLE SWALLOWING: 0

## 2023-10-09 NOTE — PROGRESS NOTES
Panda Olsen MD, [facility]            Reason for visit: Follow-up of thyroid nodules      ASSESSMENT AND PLAN:    1. Postsurgical hypothyroidism  TSH remains slightly higher than prior to thyroid lobectomy. I will increase her Tirosint dose again as below. She will recheck thyroid function tests in 6 weeks and then again prior to the next appointment with me. - TIROSINT 62.5 MCG CAPS; Take 62.5 mcg by mouth daily  Dispense: 90 capsule; Refill: 3  - TSH; Future  - T4, Free; Future  - T3; Future  - TSH; Future  - T4, Free; Future  - T3; Future    2. Multiple thyroid nodules  Periodic imaging of the residual left lobe is warranted. Ultrasound will be repeated at her next appointment. Follow-up and Dispositions    Return in about 4 months (around 2024). History of Present Illness:    THYROID NODULES / THYROID DYSFUNCTION  Kassie Bennett is seen today in the [facility] for follow up of a thyroid nodule. There is not a history of radiation to the head/neck. The patient does not have a family history of thyroid cancer. Because of the presence of a large right thyroid lobe nodule with potentially attributable symptoms, she underwent right lobectomy and isthmusectomy with Dr. Marivel Carreno 2023. Surgical pathology was fortunately benign. She was found to have subclinical hypothyroidism in 2023, and treatment thereof was initiated. Current symptoms: See review of systems below     Treatment of thyroid dysfunction: Tirosint 13 mcg daily was started 2023. Therapy has been adjusted as follows:  -Tirosint 25 mcg daily 2023  -Tirosint 50 mcg daily 8/10/2023    Prior imagin2012: Ultrasound (S)- Right lobe 5.2 x 1.3 x 1.6 cm, left lobe 5.0 x 1.0 x 1.2 cm. 1.3 x 0.7 x 1.0 cm complex problem rarely cystic nodule at the right lower pole.  0.9 x 0.4 x 0.5 cm complex cyst at the left upper

## 2023-10-24 ENCOUNTER — PATIENT MESSAGE (OUTPATIENT)
Dept: ENDOCRINOLOGY | Age: 38
End: 2023-10-24

## 2023-10-24 DIAGNOSIS — E89.0 POSTSURGICAL HYPOTHYROIDISM: ICD-10-CM

## 2023-10-24 DIAGNOSIS — Z86.32 HISTORY OF GESTATIONAL DIABETES: Primary | ICD-10-CM

## 2023-10-25 NOTE — TELEPHONE ENCOUNTER
From: Thomas Strong  To: Dr. Micky Alva: 10/24/2023 10:55 AM EDT  Subject: Adding HA1C to Upcoming Labs     Hello! I meant to ask, but forgot during my appointment, if we could add a hemoglobin A1C lab test to my upcoming labs. We usually test for this yearly due to my history of gestational diabetes in my pregnancies. Thank you!

## 2024-04-22 ENCOUNTER — PATIENT MESSAGE (OUTPATIENT)
Dept: ENDOCRINOLOGY | Age: 39
End: 2024-04-22

## 2024-04-22 DIAGNOSIS — E89.0 POSTSURGICAL HYPOTHYROIDISM: Primary | ICD-10-CM

## 2024-04-22 DIAGNOSIS — Z86.32 HISTORY OF GESTATIONAL DIABETES: ICD-10-CM

## 2024-04-22 DIAGNOSIS — E89.0 POSTSURGICAL HYPOTHYROIDISM: ICD-10-CM

## 2024-04-22 LAB
EST. AVERAGE GLUCOSE BLD GHB EST-MCNC: 100 MG/DL
HBA1C MFR BLD: 5.1 % (ref 4.8–5.6)
T3 SERPL-MCNC: 1.15 NG/ML (ref 0.6–1.81)
T4 FREE SERPL-MCNC: 0.8 NG/DL (ref 0.78–1.46)
TSH, 3RD GENERATION: 4.28 UIU/ML (ref 0.36–3.74)

## 2024-04-23 RX ORDER — THYROID,PORK 15 MG
15 TABLET ORAL DAILY
Qty: 30 TABLET | Refills: 5 | Status: SHIPPED | OUTPATIENT
Start: 2024-04-23

## 2024-04-23 NOTE — TELEPHONE ENCOUNTER
From: Bruna Strong  To: Dr. Tod Garcia  Sent: 4/22/2024 4:37 PM EDT  Subject: TSH Results    Hello!    As suspected, due to symptoms, my TSH has risen. I had not gotten any labs since October after stopping the Tirosint and honestly had felt ok up until about 6ish weeks ago. Due to my increased sensitivity to all medications, I would like to try a more natural med for my thyroid function. Which would you suggest [place] or NP thyroid? I personally would like to try [place].     For reference, I had a follow up appt earlier this month but had to reschedule due to a conflict with work, so currently I’m not scheduled until July, which was the first opening available.

## 2024-06-05 ENCOUNTER — PATIENT MESSAGE (OUTPATIENT)
Dept: ENDOCRINOLOGY | Age: 39
End: 2024-06-05

## 2024-06-05 DIAGNOSIS — E89.0 POSTSURGICAL HYPOTHYROIDISM: Primary | ICD-10-CM

## 2024-06-05 DIAGNOSIS — E89.0 POSTSURGICAL HYPOTHYROIDISM: ICD-10-CM

## 2024-06-05 DIAGNOSIS — Z86.32 HISTORY OF GESTATIONAL DIABETES: ICD-10-CM

## 2024-06-05 LAB
T3 SERPL-MCNC: 1.24 NG/ML (ref 0.6–1.81)
T4 FREE SERPL-MCNC: 0.9 NG/DL (ref 0.9–1.7)
TSH, 3RD GENERATION: 3.82 UIU/ML (ref 0.27–4.2)

## 2024-06-05 RX ORDER — THYROID 30 MG/1
30 TABLET ORAL DAILY
Qty: 30 TABLET | Refills: 3 | Status: SHIPPED | OUTPATIENT
Start: 2024-06-05

## 2024-06-05 NOTE — TELEPHONE ENCOUNTER
From: Bruna Strong  To: Dr. Tod Garcia  Sent: 6/5/2024 4:12 PM EDT  Subject: 6 Week Med TSH Results     Hello!    After 6 weeks on the 15mg Cantil my TSH came down to 3.8 from 4.2. Is it ok if I start 30mg? I can take 2 of the remainder of my 15mg prescription until the 30mg script is put in and picked up. Thanks!

## 2024-07-08 DIAGNOSIS — E89.0 POSTSURGICAL HYPOTHYROIDISM: ICD-10-CM

## 2024-07-08 LAB
T3 SERPL-MCNC: 1.46 NG/ML (ref 0.6–1.81)
T4 FREE SERPL-MCNC: 0.9 NG/DL (ref 0.9–1.7)
TSH, 3RD GENERATION: 2.17 UIU/ML (ref 0.27–4.2)

## 2024-07-09 ENCOUNTER — OFFICE VISIT (OUTPATIENT)
Dept: ENDOCRINOLOGY | Age: 39
End: 2024-07-09
Payer: COMMERCIAL

## 2024-07-09 VITALS
SYSTOLIC BLOOD PRESSURE: 121 MMHG | OXYGEN SATURATION: 98 % | HEART RATE: 79 BPM | DIASTOLIC BLOOD PRESSURE: 79 MMHG | WEIGHT: 181 LBS

## 2024-07-09 DIAGNOSIS — E04.2 MULTIPLE THYROID NODULES: ICD-10-CM

## 2024-07-09 DIAGNOSIS — E89.0 POSTSURGICAL HYPOTHYROIDISM: Primary | ICD-10-CM

## 2024-07-09 PROCEDURE — 99214 OFFICE O/P EST MOD 30 MIN: CPT | Performed by: INTERNAL MEDICINE

## 2024-07-09 PROCEDURE — 76536 US EXAM OF HEAD AND NECK: CPT | Performed by: INTERNAL MEDICINE

## 2024-07-09 RX ORDER — THYROID 30 MG/1
30 TABLET ORAL DAILY
Qty: 30 TABLET | Refills: 11 | Status: SHIPPED | OUTPATIENT
Start: 2024-07-09

## 2024-07-09 RX ORDER — THYROID,PORK 15 MG
15 TABLET ORAL DAILY
Qty: 30 TABLET | Refills: 11 | Status: SHIPPED | OUTPATIENT
Start: 2024-07-09

## 2024-07-09 ASSESSMENT — ENCOUNTER SYMPTOMS
VOICE CHANGE: 0
TROUBLE SWALLOWING: 0

## 2024-07-09 NOTE — PROGRESS NOTES
1.64 x 2.90 cm isoechoic spongiform nodule without calcifications or increased blood flow in the midportion of the left lobe (TR 2).      Prior laboratory evaluation:  8/8/2013: TSH 1.446.  6/13/2018: TSH 0.441, free T4 1.15.  10/1/2018: TSH 0.722.  4/25/2019: TSH 0.700, free T4 1.24.  10/16/2019: TSH 0.663, free T4 1.83.  10/16/2020: TSH 1.060, free T4 1.19.  10/26/2021: TSH 0.027, free T4 1.14, hemoglobin A1c 5.4%.  12/22/2021: TSH 0.202, free T4 1.13, T3 148, thyroid-stimulating immunoglobulins less than 0.10.  4/5/2022: TSH 0.501, free T4 1.13, T3 150.  10/26/2022: TSH 0.610, free T4 0.9, T3 1.38.  3/31/2023: TSH 3.450, free T4 0.8.  4/27/2023: TSH 3.820, free T4 0.8, T4 6.9, free T3 2.9, T3 1.11.  5/30/2023: TSH 2.490, free T4 0.8.  6/29/2023: TSH 3.010, free T4 0.9.  8/10/2023: TSH 2.470, free T4 0.9.  10/4/2023: TSH 1.780, free T4 1.0.  4/22/2024: TSH 4.280, free T4 0.8, T3 1.15, hemoglobin A1c 5.1%.  6/5/2024: TSH 3.820, free T4 0.9, T3 1.24.  7/8/2024: TSH 2.170, free T4 0.9, T3 1.46.    Prior biopsies:  8/8/2013: Fine needle aspiration biopsy of right lobe nodule ([facility])- benign.    Review of Systems   Constitutional:  Positive for unexpected weight change (previously gained 15 pounds in 6 months; intentionally lost 4 pounds in the last 9 months). Negative for fatigue.   HENT:  Negative for trouble swallowing and voice change.    Cardiovascular:  Positive for palpitations (rare, no worse lately).   Psychiatric/Behavioral:  Negative for sleep disturbance.        /79 (Site: Left Upper Arm, Position: Sitting)   Pulse 79   Wt 82.1 kg (181 lb)   SpO2 98%   Wt Readings from Last 3 Encounters:   07/09/24 82.1 kg (181 lb)   10/09/23 83.9 kg (185 lb)   04/04/23 77.1 kg (170 lb)       Physical Exam  HENT:      Head: Normocephalic.   Neck:      Thyroid: No thyroid mass or thyromegaly.      Comments: Healed thyroidectomy scar.  No palpable neck masses.  Cardiovascular:      Rate and Rhythm: Normal rate.

## 2024-10-30 ENCOUNTER — PATIENT MESSAGE (OUTPATIENT)
Dept: ENDOCRINOLOGY | Age: 39
End: 2024-10-30

## 2024-10-30 DIAGNOSIS — E89.0 POSTSURGICAL HYPOTHYROIDISM: Primary | ICD-10-CM

## 2024-10-30 DIAGNOSIS — E89.0 POSTSURGICAL HYPOTHYROIDISM: ICD-10-CM

## 2024-10-30 LAB
T3 SERPL-MCNC: 2.02 NG/ML (ref 0.6–1.81)
T4 FREE SERPL-MCNC: 1 NG/DL (ref 0.9–1.7)
TSH, 3RD GENERATION: 2.62 UIU/ML (ref 0.27–4.2)

## 2024-10-31 RX ORDER — THYROID,PORK 60 MG
60 TABLET ORAL DAILY
Qty: 30 TABLET | Refills: 5 | Status: SHIPPED | OUTPATIENT
Start: 2024-10-31

## 2025-02-13 ENCOUNTER — OFFICE VISIT (OUTPATIENT)
Dept: ENDOCRINOLOGY | Age: 40
End: 2025-02-13
Payer: COMMERCIAL

## 2025-02-13 VITALS
HEIGHT: 68 IN | HEART RATE: 81 BPM | DIASTOLIC BLOOD PRESSURE: 74 MMHG | OXYGEN SATURATION: 99 % | BODY MASS INDEX: 27.98 KG/M2 | WEIGHT: 184.6 LBS | SYSTOLIC BLOOD PRESSURE: 122 MMHG

## 2025-02-13 DIAGNOSIS — E04.2 MULTIPLE THYROID NODULES: ICD-10-CM

## 2025-02-13 DIAGNOSIS — E89.0 POSTSURGICAL HYPOTHYROIDISM: Primary | ICD-10-CM

## 2025-02-13 DIAGNOSIS — E89.0 POSTSURGICAL HYPOTHYROIDISM: ICD-10-CM

## 2025-02-13 LAB
T3 SERPL-MCNC: 2.17 NG/ML (ref 0.6–1.81)
T4 FREE SERPL-MCNC: 0.9 NG/DL (ref 0.9–1.7)
TSH, 3RD GENERATION: 1.01 UIU/ML (ref 0.27–4.2)

## 2025-02-13 PROCEDURE — 99214 OFFICE O/P EST MOD 30 MIN: CPT | Performed by: INTERNAL MEDICINE

## 2025-02-13 RX ORDER — THYROID,PORK 60 MG
60 TABLET ORAL DAILY
Qty: 30 TABLET | Refills: 5 | Status: SHIPPED | OUTPATIENT
Start: 2025-02-13 | End: 2025-02-13 | Stop reason: SDUPTHER

## 2025-02-13 RX ORDER — THYROID,PORK 60 MG
60 TABLET ORAL DAILY
Qty: 30 TABLET | Refills: 11 | Status: SHIPPED | OUTPATIENT
Start: 2025-02-13

## 2025-02-13 ASSESSMENT — ENCOUNTER SYMPTOMS
VOICE CHANGE: 0
TROUBLE SWALLOWING: 0

## 2025-02-13 NOTE — PROGRESS NOTES
BREONNA Garcia MD, [facility] ENDOCRINOLOGY   AND   [facility]            Reason for visit: Follow-up of thyroid nodules      ASSESSMENT AND PLAN:    1. Postsurgical hypothyroidism  She has not yet rechecked labs following her most recent thyroid hormone replacement dose adjustment.  She will check thyroid function today and again prior to the next appointment with me.  - ARMOUR THYROID 60 MG tablet; Take 1 tablet by mouth daily  Dispense: 30 tablet; Refill: 5  - TSH; Future  - T4, Free; Future  - T3; Future  - TSH; Future  - T4, Free; Future  - T3; Future    2. Multiple thyroid nodules  Thyroid ultrasound was repeated most recently in July 2024.  The nodule in the residual left lobe was unchanged.  Repeat at a 2-year interval.    [employer] TI-RADS recommendations:   TR1: Benign, no FNA or ultrasound follow-up   TR2: Nonsuspicious, no FNA or ultrasound follow-up   TR3: Mildly suspicious, FNA if greater than or equal to 2.5 cm, follow with ultrasound at 1, 3,   and 5 years if greater than or equal to 1.5 cm.   TR4: Moderately suspicious, FNA if greater than or equal to 1.5 cm, follow with ultrasound at 1,   2, 3, and 5 years if greater than or equal to 1 cm.   TR5: Highly suspicious, FNA if greater than or equal to 1 cm, follow with ultrasound annually   for 5 years if greater than or equal to 0.5 cm.               Follow-up and Dispositions    Return in about 6 months (around 8/13/2025).                 History of Present Illness:    THYROID NODULES / THYROID DYSFUNCTION  Bruna Strong is seen today in the [facility] for follow up of a thyroid nodule. There is not a history of radiation to the head/neck. The patient does not have a family history of thyroid cancer.    Because of the presence of a large right thyroid lobe nodule with potentially attributable symptoms, she underwent right lobectomy and isthmusectomy with Dr. Tran 2/17/2023.  Surgical

## 2025-03-26 ENCOUNTER — PATIENT MESSAGE (OUTPATIENT)
Dept: ENDOCRINOLOGY | Age: 40
End: 2025-03-26

## 2025-03-26 DIAGNOSIS — Z86.32 HISTORY OF GESTATIONAL DIABETES: ICD-10-CM

## 2025-03-26 DIAGNOSIS — E89.0 POSTSURGICAL HYPOTHYROIDISM: Primary | ICD-10-CM
